# Patient Record
Sex: FEMALE | Race: WHITE | NOT HISPANIC OR LATINO | URBAN - METROPOLITAN AREA
[De-identification: names, ages, dates, MRNs, and addresses within clinical notes are randomized per-mention and may not be internally consistent; named-entity substitution may affect disease eponyms.]

---

## 2020-10-27 ENCOUNTER — OFFICE VISIT (OUTPATIENT)
Dept: URBAN - METROPOLITAN AREA CLINIC 68 | Facility: CLINIC | Age: 75
End: 2020-10-27

## 2021-02-26 ENCOUNTER — OFFICE VISIT (OUTPATIENT)
Dept: URBAN - METROPOLITAN AREA CLINIC 68 | Facility: CLINIC | Age: 76
End: 2021-02-26

## 2021-03-20 ENCOUNTER — OFFICE VISIT (OUTPATIENT)
Dept: URBAN - METROPOLITAN AREA CLINIC 68 | Facility: CLINIC | Age: 76
End: 2021-03-20

## 2021-08-29 ENCOUNTER — OFFICE VISIT (OUTPATIENT)
Dept: URBAN - METROPOLITAN AREA CLINIC 68 | Facility: CLINIC | Age: 76
End: 2021-08-29

## 2021-10-26 ENCOUNTER — OFFICE VISIT (OUTPATIENT)
Dept: URBAN - METROPOLITAN AREA CLINIC 68 | Facility: CLINIC | Age: 76
End: 2021-10-26

## 2021-11-17 ENCOUNTER — OFFICE VISIT (OUTPATIENT)
Dept: URBAN - METROPOLITAN AREA SURGERY CENTER 12 | Facility: SURGERY CENTER | Age: 76
End: 2021-11-17

## 2021-11-18 ENCOUNTER — LAB OUTSIDE AN ENCOUNTER (OUTPATIENT)
Dept: URBAN - METROPOLITAN AREA CLINIC 68 | Facility: CLINIC | Age: 76
End: 2021-11-18

## 2021-11-18 LAB — 01: (no result)

## 2021-11-22 ENCOUNTER — TELEPHONE ENCOUNTER (OUTPATIENT)
Dept: URBAN - METROPOLITAN AREA CLINIC 68 | Facility: CLINIC | Age: 76
End: 2021-11-22

## 2021-12-01 ENCOUNTER — OFFICE VISIT (OUTPATIENT)
Dept: URBAN - METROPOLITAN AREA CLINIC 68 | Facility: CLINIC | Age: 76
End: 2021-12-01

## 2021-12-03 ENCOUNTER — OFFICE VISIT (OUTPATIENT)
Dept: URBAN - METROPOLITAN AREA MEDICAL CENTER 21 | Facility: MEDICAL CENTER | Age: 76
End: 2021-12-03

## 2021-12-13 ENCOUNTER — LAB OUTSIDE AN ENCOUNTER (OUTPATIENT)
Dept: URBAN - METROPOLITAN AREA CLINIC 68 | Facility: CLINIC | Age: 76
End: 2021-12-13

## 2021-12-13 ENCOUNTER — TELEPHONE ENCOUNTER (OUTPATIENT)
Dept: URBAN - METROPOLITAN AREA CLINIC 68 | Facility: CLINIC | Age: 76
End: 2021-12-13

## 2022-04-12 ENCOUNTER — OFFICE VISIT (OUTPATIENT)
Dept: URBAN - METROPOLITAN AREA CLINIC 68 | Facility: CLINIC | Age: 77
End: 2022-04-12

## 2022-04-13 ENCOUNTER — OFFICE VISIT (OUTPATIENT)
Dept: URBAN - METROPOLITAN AREA CLINIC 68 | Facility: CLINIC | Age: 77
End: 2022-04-13

## 2022-04-26 ENCOUNTER — OFFICE VISIT (OUTPATIENT)
Dept: URBAN - METROPOLITAN AREA CLINIC 68 | Facility: CLINIC | Age: 77
End: 2022-04-26

## 2022-05-10 ENCOUNTER — OFFICE VISIT (OUTPATIENT)
Dept: URBAN - METROPOLITAN AREA CLINIC 68 | Facility: CLINIC | Age: 77
End: 2022-05-10

## 2022-06-04 ENCOUNTER — TELEPHONE ENCOUNTER (OUTPATIENT)
Dept: URBAN - METROPOLITAN AREA CLINIC 68 | Facility: CLINIC | Age: 77
End: 2022-06-04

## 2022-06-04 RX ORDER — PANTOPRAZOLE SODIUM 40 MG/1
TABLET, DELAYED RELEASE ORAL DAILY
Qty: 90 | Refills: 0 | OUTPATIENT
Start: 2015-08-18 | End: 2015-08-26

## 2022-06-04 RX ORDER — AMOXICILLIN 500 MG/1
CAPSULE ORAL
Qty: 40 | Refills: 0 | OUTPATIENT
Start: 2015-08-26 | End: 2015-09-05

## 2022-06-04 RX ORDER — VENLAFAXINE HYDROCHLORIDE 75 MG/1
EFFEXOR XR( 75MG ORAL  TWO TIMES DAILY ) INACTIVE -HX ENTRY CAPSULE, EXTENDED RELEASE ORAL
OUTPATIENT
Start: 2021-10-26

## 2022-06-04 RX ORDER — SODIUM SULFATE, POTASSIUM SULFATE, MAGNESIUM SULFATE 17.5; 3.13; 1.6 G/ML; G/ML; G/ML
SOLUTION, CONCENTRATE ORAL AS DIRECTED
Qty: 1 | Refills: 0 | OUTPATIENT
Start: 2021-12-01 | End: 2021-12-02

## 2022-06-04 RX ORDER — CLARITHROMYCIN 500 MG/1
TABLET, FILM COATED ORAL
Qty: 20 | Refills: 0 | OUTPATIENT
Start: 2015-08-26 | End: 2015-09-05

## 2022-06-04 RX ORDER — OMEPRAZOLE 20 MG/1
CAPSULE, DELAYED RELEASE ORAL
Qty: 20 | Refills: 0 | OUTPATIENT
Start: 2015-08-26 | End: 2015-09-05

## 2022-06-05 ENCOUNTER — TELEPHONE ENCOUNTER (OUTPATIENT)
Dept: URBAN - METROPOLITAN AREA CLINIC 68 | Facility: CLINIC | Age: 77
End: 2022-06-05

## 2022-06-05 RX ORDER — VENLAFAXINE HYDROCHLORIDE 75 MG/1
VENLAFAXINE HCL( 75MG ORAL  DAILY ) ACTIVE -HX ENTRY TABLET ORAL DAILY
Status: ACTIVE | COMMUNITY
Start: 2021-12-01

## 2022-06-05 RX ORDER — ASPIRIN 81 MG/1
ASPIRIN( 81MG ORAL  DAILY ) ACTIVE -HX ENTRY TABLET, COATED ORAL DAILY
Status: ACTIVE | COMMUNITY
Start: 2021-12-01

## 2022-06-05 RX ORDER — LETROZOLE TABLETS 2.5 MG/1
LETROZOLE( 2.5MG ORAL  DAILY ) ACTIVE -HX ENTRY TABLET, FILM COATED ORAL DAILY
Status: ACTIVE | COMMUNITY
Start: 2021-12-01

## 2022-06-17 ENCOUNTER — OFFICE VISIT (OUTPATIENT)
Dept: URBAN - METROPOLITAN AREA CLINIC 68 | Facility: CLINIC | Age: 77
End: 2022-06-17

## 2022-06-25 ENCOUNTER — TELEPHONE ENCOUNTER (OUTPATIENT)
Age: 77
End: 2022-06-25

## 2022-06-25 RX ORDER — PANTOPRAZOLE 40 MG/1
TABLET, DELAYED RELEASE ORAL DAILY
Qty: 90 | Refills: 0 | OUTPATIENT
Start: 2015-08-18 | End: 2015-08-26

## 2022-06-25 RX ORDER — LETROZOLE TABLETS 2.5 MG/1
LETROZOLE( 2.5MG ORAL  DAILY ) INACTIVE -HX ENTRY TABLET, FILM COATED ORAL DAILY
OUTPATIENT
Start: 2022-04-12

## 2022-06-25 RX ORDER — CLARITHROMYCIN 500 MG/1
TABLET ORAL
Qty: 20 | Refills: 0 | OUTPATIENT
Start: 2015-08-26 | End: 2015-09-05

## 2022-06-25 RX ORDER — SODIUM SULFATE, POTASSIUM SULFATE, MAGNESIUM SULFATE 17.5; 3.13; 1.6 G/ML; G/ML; G/ML
SOLUTION, CONCENTRATE ORAL AS DIRECTED
Qty: 1 | Refills: 0 | OUTPATIENT
Start: 2021-12-01 | End: 2021-12-02

## 2022-06-25 RX ORDER — OMEPRAZOLE 20 MG/1
CAPSULE, DELAYED RELEASE ORAL
Qty: 20 | Refills: 0 | OUTPATIENT
Start: 2015-08-26 | End: 2015-09-05

## 2022-06-25 RX ORDER — AMOXICILLIN 500 MG/1
CAPSULE ORAL
Qty: 40 | Refills: 0 | OUTPATIENT
Start: 2015-08-26 | End: 2015-09-05

## 2022-06-26 ENCOUNTER — TELEPHONE ENCOUNTER (OUTPATIENT)
Age: 77
End: 2022-06-26

## 2022-06-26 RX ORDER — ASPIRIN 81 MG/1
ASPIRIN( 81MG ORAL  DAILY ) ACTIVE -HX ENTRY TABLET, COATED ORAL DAILY
Status: ACTIVE | COMMUNITY
Start: 2022-06-17

## 2022-06-26 RX ORDER — QUINIDINE SULFATE 200 MG
VITAMIN D3(     ) ACTIVE -HX ENTRY TABLET ORAL
Status: ACTIVE | COMMUNITY
Start: 2022-06-17

## 2022-06-26 RX ORDER — VENLAFAXINE HYDROCHLORIDE 75 MG/1
VENLAFAXINE HCL( 75MG ORAL  DAILY ) ACTIVE -HX ENTRY TABLET ORAL DAILY
Status: ACTIVE | COMMUNITY
Start: 2022-06-17

## 2022-06-26 RX ORDER — EZETIMIBE 10 MG/1
EZETIMIBE( 10MG ORAL   ) ACTIVE -HX ENTRY TABLET ORAL
Status: ACTIVE | COMMUNITY
Start: 2022-06-17

## 2022-07-15 ENCOUNTER — OFFICE VISIT (OUTPATIENT)
Dept: URBAN - METROPOLITAN AREA CLINIC 68 | Facility: CLINIC | Age: 77
End: 2022-07-15

## 2022-11-23 ENCOUNTER — OFFICE VISIT (OUTPATIENT)
Dept: URBAN - METROPOLITAN AREA CLINIC 68 | Facility: CLINIC | Age: 77
End: 2022-11-23

## 2022-11-23 RX ORDER — LETROZOLE TABLETS 2.5 MG/1
LETROZOLE( 2.5MG ORAL  DAILY ) ACTIVE -HX ENTRY TABLET, FILM COATED ORAL DAILY
Status: ACTIVE | COMMUNITY
Start: 2021-12-01

## 2022-11-23 RX ORDER — VENLAFAXINE HYDROCHLORIDE 75 MG/1
VENLAFAXINE HCL( 75MG ORAL  DAILY ) ACTIVE -HX ENTRY TABLET ORAL DAILY
Status: ACTIVE | COMMUNITY
Start: 2021-12-01

## 2022-11-23 RX ORDER — ASPIRIN 81 MG/1
ASPIRIN( 81MG ORAL  DAILY ) ACTIVE -HX ENTRY TABLET, COATED ORAL DAILY
Status: ACTIVE | COMMUNITY
Start: 2021-12-01

## 2022-11-23 NOTE — HPI-MIGRATED HPI
Transition of Care : Patient evaluated due to anemia. Referred had recent labs reported with mild anemia and endoscopic evaluation was recommended.  Denies nausea, vomits, dysphagia, odynophagia, heartburn, abdominal pain, diarrhea, constipation GI bleeding or weight loss

## 2022-12-13 ENCOUNTER — OFFICE VISIT (OUTPATIENT)
Dept: URBAN - METROPOLITAN AREA CLINIC 68 | Facility: CLINIC | Age: 77
End: 2022-12-13

## 2022-12-22 ENCOUNTER — OFFICE VISIT (OUTPATIENT)
Dept: URBAN - METROPOLITAN AREA SURGERY CENTER 12 | Facility: SURGERY CENTER | Age: 77
End: 2022-12-22

## 2022-12-22 RX ORDER — VENLAFAXINE HYDROCHLORIDE 75 MG/1
VENLAFAXINE HCL( 75MG ORAL  DAILY ) ACTIVE -HX ENTRY TABLET ORAL DAILY
Status: ACTIVE | COMMUNITY
Start: 2021-12-01

## 2022-12-22 RX ORDER — ASPIRIN 81 MG/1
ASPIRIN( 81MG ORAL  DAILY ) ACTIVE -HX ENTRY TABLET, COATED ORAL DAILY
Status: ACTIVE | COMMUNITY
Start: 2021-12-01

## 2022-12-22 RX ORDER — PANTOPRAZOLE SODIUM 40 MG/1
1 TABLET TABLET, DELAYED RELEASE ORAL ONCE A DAY
Qty: 90 TABLET | Refills: 1 | OUTPATIENT
Start: 2022-12-22

## 2022-12-22 RX ORDER — LETROZOLE TABLETS 2.5 MG/1
LETROZOLE( 2.5MG ORAL  DAILY ) ACTIVE -HX ENTRY TABLET, FILM COATED ORAL DAILY
Status: ACTIVE | COMMUNITY
Start: 2021-12-01

## 2023-01-06 ENCOUNTER — OFFICE VISIT (OUTPATIENT)
Dept: URBAN - METROPOLITAN AREA CLINIC 68 | Facility: CLINIC | Age: 78
End: 2023-01-06

## 2023-01-06 ENCOUNTER — DASHBOARD ENCOUNTERS (OUTPATIENT)
Age: 78
End: 2023-01-06

## 2023-01-06 ENCOUNTER — TELEPHONE ENCOUNTER (OUTPATIENT)
Dept: URBAN - METROPOLITAN AREA CLINIC 68 | Facility: CLINIC | Age: 78
End: 2023-01-06

## 2023-01-06 RX ORDER — LETROZOLE TABLETS 2.5 MG/1
LETROZOLE( 2.5MG ORAL  DAILY ) ACTIVE -HX ENTRY TABLET, FILM COATED ORAL DAILY
Status: ACTIVE | COMMUNITY
Start: 2021-12-01

## 2023-01-06 RX ORDER — PANTOPRAZOLE SODIUM 40 MG/1
1 TABLET TABLET, DELAYED RELEASE ORAL ONCE A DAY
Qty: 90 TABLET | Refills: 1 | Status: ACTIVE | COMMUNITY
Start: 2022-12-22

## 2023-01-06 RX ORDER — VENLAFAXINE HYDROCHLORIDE 75 MG/1
VENLAFAXINE HCL( 75MG ORAL  DAILY ) ACTIVE -HX ENTRY TABLET ORAL DAILY
Status: ACTIVE | COMMUNITY
Start: 2021-12-01

## 2023-01-06 RX ORDER — ASPIRIN 81 MG/1
ASPIRIN( 81MG ORAL  DAILY ) ACTIVE -HX ENTRY TABLET, COATED ORAL DAILY
Status: ACTIVE | COMMUNITY
Start: 2021-12-01

## 2023-01-06 NOTE — HPI-MIGRATED HPI
Transition of Care : Patient after having EGD found with gastritis and gastric ulcer.  Today referred that is feeling better since started daily pantoprazole.  Denies nausea, vomits, dysphagia, odynophagia, heartburn, abdominal pain, diarrhea, constipation GI bleeding or weight loss

## 2023-02-08 ENCOUNTER — OFFICE VISIT (OUTPATIENT)
Dept: URBAN - METROPOLITAN AREA SURGERY CENTER 12 | Facility: SURGERY CENTER | Age: 78
End: 2023-02-08

## 2023-07-24 ENCOUNTER — TELEPHONE ENCOUNTER (OUTPATIENT)
Dept: URBAN - METROPOLITAN AREA CLINIC 63 | Facility: CLINIC | Age: 78
End: 2023-07-24